# Patient Record
Sex: MALE | Race: WHITE | URBAN - NONMETROPOLITAN AREA
[De-identification: names, ages, dates, MRNs, and addresses within clinical notes are randomized per-mention and may not be internally consistent; named-entity substitution may affect disease eponyms.]

---

## 2022-01-12 ENCOUNTER — WEB ENCOUNTER (OUTPATIENT)
Dept: URBAN - NONMETROPOLITAN AREA CLINIC 4 | Facility: CLINIC | Age: 57
End: 2022-01-12

## 2022-01-12 ENCOUNTER — DASHBOARD ENCOUNTERS (OUTPATIENT)
Age: 57
End: 2022-01-12

## 2022-01-12 ENCOUNTER — OFFICE VISIT (OUTPATIENT)
Dept: URBAN - NONMETROPOLITAN AREA CLINIC 4 | Facility: CLINIC | Age: 57
End: 2022-01-12
Payer: MEDICARE

## 2022-01-12 DIAGNOSIS — K74.60 CIRRHOSIS OF LIVER WITHOUT ASCITES, UNSPECIFIED HEPATIC CIRRHOSIS TYPE: ICD-10-CM

## 2022-01-12 DIAGNOSIS — R79.89 ABNORMAL LFTS: ICD-10-CM

## 2022-01-12 DIAGNOSIS — Z12.11 ENCOUNTER FOR SCREENING COLONOSCOPY: ICD-10-CM

## 2022-01-12 DIAGNOSIS — Z79.02 ANTIPLATELET OR ANTITHROMBOTIC LONG-TERM USE: ICD-10-CM

## 2022-01-12 PROBLEM — 19943007: Status: ACTIVE | Noted: 2022-01-12

## 2022-01-12 PROBLEM — 305058001: Status: ACTIVE | Noted: 2022-01-12

## 2022-01-12 PROCEDURE — 99204 OFFICE O/P NEW MOD 45 MIN: CPT | Performed by: INTERNAL MEDICINE

## 2022-01-12 PROCEDURE — 99244 OFF/OP CNSLTJ NEW/EST MOD 40: CPT | Performed by: INTERNAL MEDICINE

## 2022-01-12 RX ORDER — PRIMIDONE 50 MG/1
1 TABLET TABLET ORAL ONCE A DAY
Status: ACTIVE | COMMUNITY

## 2022-01-12 RX ORDER — GABAPENTIN 100 MG/1
AS DIRECTED CAPSULE ORAL TID
Status: ACTIVE | COMMUNITY

## 2022-01-12 RX ORDER — CAMPHOR AND MENTHOL 5; 5 MG/ML; MG/ML
1 APPLICATION AS NEEDED LOTION TOPICAL TWICE A DAY
Status: ACTIVE | COMMUNITY

## 2022-01-12 RX ORDER — CLOPIDOGREL BISULFATE 75 MG/1
1 TABLET TABLET ORAL ONCE A DAY
Status: ACTIVE | COMMUNITY

## 2022-01-12 RX ORDER — FOLIC ACID 1 MG/1
1 TABLET TABLET ORAL ONCE A DAY
Status: ACTIVE | COMMUNITY

## 2022-01-12 RX ORDER — BENZONATATE 200 MG/1
1 CAPSULE CAPSULE ORAL THREE TIMES A DAY
Status: ACTIVE | COMMUNITY

## 2022-01-12 RX ORDER — ASPIRIN 81 MG/1
1 TABLET TABLET, COATED ORAL ONCE A DAY
Status: ACTIVE | COMMUNITY

## 2022-01-12 RX ORDER — ATORVASTATIN CALCIUM 10 MG/1
1 TABLET TABLET, FILM COATED ORAL ONCE A DAY
Status: ACTIVE | COMMUNITY

## 2022-01-12 RX ORDER — FLUOXETINE 40 MG/1
1 CAPSULE CAPSULE ORAL ONCE A DAY
Status: ACTIVE | COMMUNITY

## 2022-01-12 RX ORDER — LEVOFLOXACIN 750 MG/1
1 TABLET TABLET, FILM COATED ORAL ONCE A DAY
Status: ACTIVE | COMMUNITY

## 2022-01-12 NOTE — HPI-TODAY'S VISIT:
31 year old female with hx of HTN, not taking medications, presents complaining of palpitations and weakness. PT reports alcohol use last night, denies drug use.     This note is for triage purposes only. My evaluation is not meant to provide definitive treatment nor is this note intended to diagnose or disposition the patient unless otherwise stated.       Christal Duran PA-C  12/19/21 1024     Patient is a 55 yo man referred by Dr. Leeroy Bernal for above reasons. A copy of this document will be sent to referring provider. He was recently diagnosed with abnormal LFTs; Transaminases 3-7 x ULN with intact synthetic function. Improvement noted on most recent draw in Nov 2021. He has no prior history of liver disease. Risk factors include remote history of ETOH use. He is not a diabetic but has HLD and overweight status. He has PVD. He has no signs of advanced liver disease. RUQ USG showed mildly nodular liver. 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 He is on diability.

## 2022-01-12 NOTE — PHYSICAL EXAM SKIN:
no rashes , no suspicious lesions , bruises on both arms , no jaundice present , good turgor , no masses , no tenderness on palpation

## 2022-01-12 NOTE — PHYSICAL EXAM GASTROINTESTINAL
Abdomen , soft, nontender, excessive abdominal fat,  nondistended , no guarding or rigidity , no masses palpable , normal bowel sounds , Liver and Spleen , no hepatomegaly present , no hepatosplenomegaly , liver nontender , spleen not palpable

## 2022-01-15 LAB
A/G RATIO: 1.5
AFP, SERUM, TUMOR MARKER: 6.1
ALBUMIN: 3.7
ALKALINE PHOSPHATASE: 174
ALPHA 2-MACROGLOBULINS, QN: 231
ALT (SGPT) P5P: 29
ALT (SGPT): 23
APOLIPOPROTEIN A-1: 148
AST (SGOT) P5P: 37
AST (SGOT): 23
BASO (ABSOLUTE): 0
BASOS: 1
BILIRUBIN, TOTAL: 0.2
BILIRUBIN, TOTAL: 0.2
BUN/CREATININE RATIO: 9
BUN: 8
CALCIUM: 9.1
CARBON DIOXIDE, TOTAL: 25
CHLORIDE: 101
CHOLESTEROL, TOTAL: 243
COMMENT:: (no result)
CREATININE: 0.87
EGFR IF AFRICN AM: 110
EGFR IF NONAFRICN AM: 95
EOS (ABSOLUTE): 0.8
EOS: 11
FIBROSIS SCORE: 0.3
FIBROSIS SCORING:: (no result)
FIBROSIS STAGE: (no result)
GGT: 125
GLOBULIN, TOTAL: 2.4
GLUCOSE, SERUM: 76
GLUCOSE: 75
HAPTOGLOBIN: 142
HEIGHT:: 76
HEMATOCRIT: 40.8
HEMATOLOGY COMMENTS:: (no result)
HEMOGLOBIN: 14.3
IMMATURE CELLS: (no result)
IMMATURE GRANS (ABS): 0
IMMATURE GRANULOCYTES: 0
INR: 1
INTERPRETATIONS:: (no result)
LIMITATIONS:: (no result)
LYMPHS (ABSOLUTE): 2.3
LYMPHS: 34
MCH: 33.6
MCHC: 35
MCV: 96
MONOCYTES(ABSOLUTE): 0.5
MONOCYTES: 8
NASH GRADE: (no result)
NASH SCORE: 0.5
NASH SCORING: (no result)
NEUTROPHILS (ABSOLUTE): 3.2
NEUTROPHILS: 46
NRBC: (no result)
PLATELETS: 178
POTASSIUM: 4.3
PROTEIN, TOTAL: 6.1
PROTHROMBIN TIME: 10.4
RBC: 4.25
RDW: 13.5
SODIUM: 141
STEATOSIS GRADE: (no result)
STEATOSIS GRADING: (no result)
STEATOSIS SCORE: 0.64
TRIGLYCERIDES: 179
WBC: 6.8
WEIGHT:: 233

## 2022-01-24 ENCOUNTER — TELEPHONE ENCOUNTER (OUTPATIENT)
Dept: URBAN - METROPOLITAN AREA CLINIC 92 | Facility: CLINIC | Age: 57
End: 2022-01-24